# Patient Record
Sex: MALE | Race: WHITE | Employment: FULL TIME | URBAN - NONMETROPOLITAN AREA
[De-identification: names, ages, dates, MRNs, and addresses within clinical notes are randomized per-mention and may not be internally consistent; named-entity substitution may affect disease eponyms.]

---

## 2018-02-21 ENCOUNTER — OFFICE VISIT (OUTPATIENT)
Dept: PRIMARY CARE CLINIC | Age: 43
End: 2018-02-21
Payer: COMMERCIAL

## 2018-02-21 VITALS
OXYGEN SATURATION: 98 % | RESPIRATION RATE: 18 BRPM | BODY MASS INDEX: 32.6 KG/M2 | HEART RATE: 90 BPM | TEMPERATURE: 99.7 F | SYSTOLIC BLOOD PRESSURE: 120 MMHG | WEIGHT: 254 LBS | DIASTOLIC BLOOD PRESSURE: 80 MMHG | HEIGHT: 74 IN

## 2018-02-21 DIAGNOSIS — J01.40 ACUTE NON-RECURRENT PANSINUSITIS: Primary | ICD-10-CM

## 2018-02-21 PROCEDURE — 99203 OFFICE O/P NEW LOW 30 MIN: CPT | Performed by: FAMILY MEDICINE

## 2018-02-21 RX ORDER — AMOXICILLIN AND CLAVULANATE POTASSIUM 500; 125 MG/1; MG/1
1 TABLET, FILM COATED ORAL 2 TIMES DAILY
Qty: 20 TABLET | Refills: 0 | Status: SHIPPED | OUTPATIENT
Start: 2018-02-21 | End: 2018-03-03

## 2018-02-21 ASSESSMENT — ENCOUNTER SYMPTOMS
EYE DISCHARGE: 0
TROUBLE SWALLOWING: 0
SINUS PAIN: 0
SINUS PRESSURE: 1
ABDOMINAL PAIN: 0
DIARRHEA: 0
VOMITING: 0
COUGH: 1
CONSTIPATION: 0
RHINORRHEA: 1
NAUSEA: 0
EYE REDNESS: 0
WHEEZING: 0
SORE THROAT: 0
SHORTNESS OF BREATH: 0

## 2018-02-21 NOTE — PROGRESS NOTES
Subjective:      Patient ID: Susan Mei is a 36 y.o. male. Cough   This is a new problem. The current episode started in the past 7 days. The problem has been gradually worsening. The problem occurs constantly. The cough is productive of purulent sputum and productive of blood-tinged sputum. Associated symptoms include chills, ear pain, a fever, myalgias, nasal congestion, postnasal drip and rhinorrhea. Pertinent negatives include no chest pain, eye redness, headaches, rash, sore throat, shortness of breath or wheezing. Associated symptoms comments: Eyes are watering. Hoarseness to voice. Treatments tried: mucinex. The treatment provided moderate relief. There is no history of environmental allergies. Did review patient's med list, allergies, social history,pmhx and pshx today as noted in the record. Review of Systems   Constitutional: Positive for chills, fatigue and fever. HENT: Positive for congestion, ear pain, postnasal drip, rhinorrhea and sinus pressure. Negative for sinus pain, sore throat and trouble swallowing. Eyes: Negative for discharge and redness. Respiratory: Positive for cough. Negative for shortness of breath and wheezing. Cardiovascular: Negative for chest pain. Gastrointestinal: Negative for abdominal pain, constipation, diarrhea, nausea and vomiting. Musculoskeletal: Positive for myalgias. Negative for arthralgias and neck pain. Skin: Negative for rash and wound. Allergic/Immunologic: Negative for environmental allergies. Neurological: Negative for dizziness, weakness, light-headedness and headaches. Hematological: Negative for adenopathy. Psychiatric/Behavioral: Negative. Objective:   Physical Exam   Constitutional: He is oriented to person, place, and time. He appears well-developed and well-nourished. No distress. HENT:   Head: Normocephalic and atraumatic.    Right Ear: External ear normal.   Left Ear: External ear normal.   Thick sinus

## 2020-01-21 ENCOUNTER — OFFICE VISIT (OUTPATIENT)
Dept: PRIMARY CARE CLINIC | Age: 45
End: 2020-01-21
Payer: COMMERCIAL

## 2020-01-21 ENCOUNTER — HOSPITAL ENCOUNTER (OUTPATIENT)
Dept: GENERAL RADIOLOGY | Age: 45
Discharge: HOME | End: 2020-01-23
Payer: COMMERCIAL

## 2020-01-21 VITALS
WEIGHT: 271 LBS | DIASTOLIC BLOOD PRESSURE: 86 MMHG | OXYGEN SATURATION: 99 % | TEMPERATURE: 98.1 F | HEART RATE: 80 BPM | RESPIRATION RATE: 18 BRPM | SYSTOLIC BLOOD PRESSURE: 140 MMHG | HEIGHT: 74 IN | BODY MASS INDEX: 34.78 KG/M2

## 2020-01-21 PROBLEM — E66.9 OBESITY (BMI 30.0-34.9): Status: ACTIVE | Noted: 2020-01-21

## 2020-01-21 PROBLEM — E66.811 OBESITY (BMI 30.0-34.9): Status: ACTIVE | Noted: 2020-01-21

## 2020-01-21 PROCEDURE — 99213 OFFICE O/P EST LOW 20 MIN: CPT | Performed by: FAMILY MEDICINE

## 2020-01-21 PROCEDURE — 73562 X-RAY EXAM OF KNEE 3: CPT

## 2020-01-21 RX ORDER — METHYLPREDNISOLONE 4 MG/1
TABLET ORAL
Qty: 1 KIT | Refills: 0 | Status: SHIPPED | OUTPATIENT
Start: 2020-01-21 | End: 2020-01-27

## 2020-01-21 NOTE — PROGRESS NOTES
[facility] Urgent [facility]             [address], [address]                      Telephone [phone]             Fax [phone]       Jatin Mcfarlane  1975  MRN:  [id number]  Date of visit:  1/21/2020     Subjective:    Jatin Mcfarlane is a 38 y.o.  male who presents to [facility] today (1/21/2020) for evaluation of:  Knee Pain (left knee pain x 3 weeks, tender to medial side, sharp pains at times to patella, sweling, painful. no known injury)      He states that he has had pain in the left knee for the past 3 weeks. He denies injury. He has been taking Ibuprofen or Aleve for the pain, which has given him some temporary relief of his symptoms. He states that he twisted his left ankle a couple of weeks ago, but he denies any injury to the knee. He has the following problem list:  Patient Active Problem List   Diagnosis    Obesity (BMI 30.0-34. 9)      He does not take any prescription medications currently. He has No Known Allergies. He  reports that he has never smoked. He has never used smokeless tobacco.      Objective:    Vitals:    01/21/20 1602   BP: (!) 140/86   Pulse: 80   Resp: 18   Temp: 98.1 °F (36.7 °C)   SpO2: 99%   Weight: 271 lb (122.9 kg)   Height: 6' 2\" (1.88 m)     Body mass index is 34.79 kg/m². Well-nourished, well-developed obese  male, in no acute distress. The left knee has reduced range of motion. There is moderate tenderness to palpation of the left knee medially. There is no significant swelling of the left knee. There is no deformity of the left knee. Assessment and Plan:    Acute pain of left knee  Sprain vs. other    - methylPREDNISolone (MEDROL, COTY,) 4 MG tablet; Take by mouth as directed per instructions on dose pack. Take with food. Dispense: 1 kit; Refill: 0    - XR KNEE LEFT (3 VIEWS);  Future    He will be contacted when the radiologist's

## 2020-01-22 ENCOUNTER — TELEPHONE (OUTPATIENT)
Dept: FAMILY MEDICINE CLINIC | Age: 45
End: 2020-01-22

## 2020-01-22 NOTE — TELEPHONE ENCOUNTER
Patient was notified of xray results. Patient states his knee pain was a 9 this morning and was having trouble walking. He described the pain and sharp and \"felt like a knife stabbing into my knee every time I walked\" Patient took Aleve and also started Medrol lauren and stated he did has some relieve and the pain was \"tolerable\". He is requesting additional imaging to see what the problem is. He will schedule with ortho but wanted to see if there was more testing that could be done to get to the \"root\" of the problem.  Please advise

## 2020-02-05 ENCOUNTER — TELEPHONE (OUTPATIENT)
Dept: FAMILY MEDICINE CLINIC | Age: 45
End: 2020-02-05

## 2025-01-20 ENCOUNTER — OFFICE VISIT (OUTPATIENT)
Dept: PRIMARY CARE CLINIC | Age: 50
End: 2025-01-20
Payer: COMMERCIAL

## 2025-01-20 VITALS
DIASTOLIC BLOOD PRESSURE: 78 MMHG | BODY MASS INDEX: 36.85 KG/M2 | HEART RATE: 74 BPM | SYSTOLIC BLOOD PRESSURE: 130 MMHG | OXYGEN SATURATION: 98 % | TEMPERATURE: 97.3 F | WEIGHT: 287 LBS

## 2025-01-20 DIAGNOSIS — H60.332 ACUTE SWIMMER'S EAR OF LEFT SIDE: ICD-10-CM

## 2025-01-20 DIAGNOSIS — H66.90 ACUTE OTITIS MEDIA, UNSPECIFIED OTITIS MEDIA TYPE: ICD-10-CM

## 2025-01-20 DIAGNOSIS — H61.23 BILATERAL IMPACTED CERUMEN: Primary | ICD-10-CM

## 2025-01-20 PROCEDURE — 99203 OFFICE O/P NEW LOW 30 MIN: CPT

## 2025-01-20 PROCEDURE — 69210 REMOVE IMPACTED EAR WAX UNI: CPT

## 2025-01-20 RX ORDER — AMOXICILLIN 875 MG/1
875 TABLET, COATED ORAL 2 TIMES DAILY
Qty: 20 TABLET | Refills: 0 | Status: SHIPPED | OUTPATIENT
Start: 2025-01-20 | End: 2025-01-30

## 2025-01-20 RX ORDER — CIPROFLOXACIN AND DEXAMETHASONE 3; 1 MG/ML; MG/ML
4 SUSPENSION/ DROPS AURICULAR (OTIC) 2 TIMES DAILY
Qty: 7.5 ML | Refills: 0 | Status: SHIPPED | OUTPATIENT
Start: 2025-01-20 | End: 2025-01-27

## 2025-01-20 ASSESSMENT — PATIENT HEALTH QUESTIONNAIRE - PHQ9
SUM OF ALL RESPONSES TO PHQ9 QUESTIONS 1 & 2: 0
SUM OF ALL RESPONSES TO PHQ QUESTIONS 1-9: 0
SUM OF ALL RESPONSES TO PHQ QUESTIONS 1-9: 0
1. LITTLE INTEREST OR PLEASURE IN DOING THINGS: NOT AT ALL
SUM OF ALL RESPONSES TO PHQ QUESTIONS 1-9: 0
SUM OF ALL RESPONSES TO PHQ QUESTIONS 1-9: 0

## 2025-01-20 ASSESSMENT — ENCOUNTER SYMPTOMS
SORE THROAT: 0
COUGH: 0
RHINORRHEA: 0

## 2025-01-20 NOTE — PROGRESS NOTES
[facility] Walk In department of [facility]  [address] [facility] [address]  Phone: [phone]  Fax: [phone]      Fredrick Stephenson  1975  MRN: [id number]  Date of visit: 1/20/2025    Chief Complaint:     Fredrick Stephenson is here for c/o of Ear Fullness (Left ear did try to clean wax out )      HPI:     Fredrick Stephenson is a 49 y.o. male who presents to the [facility] Walk-In Care today for his medical conditions/complaints as noted below.    Ear Fullness   There is pain in the left ear. This is a new problem. Episode onset: 1 month. The problem occurs constantly. The problem has been gradually worsening. There has been no fever. The patient is experiencing no pain. Associated symptoms include hearing loss. Pertinent negatives include no coughing, ear discharge, rhinorrhea or sore throat. He has tried ear drops (wax removal kit) for the symptoms. The treatment provided no relief.       Past Medical History:   Diagnosis Date    Allergic rhinitis     Myopia         No Known Allergies      Subjective:      Review of Systems   HENT:  Positive for hearing loss. Negative for ear discharge, rhinorrhea and sore throat.    Respiratory:  Negative for cough.        Objective:     Vitals:    01/20/25 1239   BP: 130/78   Site: Left Upper Arm   Position: Sitting   Cuff Size: Large Adult   Pulse: 74   Temp: 97.3 °F (36.3 °C)   TempSrc: Tympanic   SpO2: 98%   Weight: 130.2 kg (287 lb)     Body mass index is 36.85 kg/m².    Physical Exam  Vitals and nursing note reviewed.   Constitutional:       Appearance: Normal appearance. He is not ill-appearing.   HENT:      Head: Normocephalic and atraumatic.      Right Ear: Ear canal and external ear normal. There is impacted cerumen. Tympanic membrane is erythematous.      Left Ear: Ear canal normal. Drainage, swelling and tenderness present. There is impacted cerumen. Tympanic membrane is erythematous and bulging.      Ears:      Comments:

## 2025-01-20 NOTE — PATIENT INSTRUCTIONS
Start antibiotics as prescribed  Complete whole course of antibiotics  Debrox drops to soften wax  May use tylenol and ibuprofen for pain/ fever  If symptoms worsen follow up with PCP or return to walk in clinic  Patient verbalized understanding and agrees with plan of care

## 2025-04-30 ENCOUNTER — OFFICE VISIT (OUTPATIENT)
Dept: PRIMARY CARE CLINIC | Age: 50
End: 2025-04-30
Payer: COMMERCIAL

## 2025-04-30 ENCOUNTER — RESULTS FOLLOW-UP (OUTPATIENT)
Dept: PRIMARY CARE CLINIC | Age: 50
End: 2025-04-30

## 2025-04-30 VITALS
TEMPERATURE: 100 F | HEIGHT: 74 IN | RESPIRATION RATE: 18 BRPM | WEIGHT: 280 LBS | OXYGEN SATURATION: 97 % | SYSTOLIC BLOOD PRESSURE: 124 MMHG | HEART RATE: 92 BPM | DIASTOLIC BLOOD PRESSURE: 80 MMHG | BODY MASS INDEX: 35.94 KG/M2

## 2025-04-30 DIAGNOSIS — R05.1 ACUTE COUGH: ICD-10-CM

## 2025-04-30 DIAGNOSIS — J01.40 ACUTE NON-RECURRENT PANSINUSITIS: Primary | ICD-10-CM

## 2025-04-30 LAB
INFLUENZA A ANTIGEN, POC: NEGATIVE
INFLUENZA B ANTIGEN, POC: NEGATIVE
LOT EXPIRE DATE: NORMAL
LOT KIT NUMBER: NORMAL
SARS-COV-2, POC: NORMAL
VALID INTERNAL CONTROL: NORMAL
VENDOR AND KIT NAME POC: NORMAL

## 2025-04-30 PROCEDURE — 87428 SARSCOV & INF VIR A&B AG IA: CPT

## 2025-04-30 PROCEDURE — 99213 OFFICE O/P EST LOW 20 MIN: CPT

## 2025-04-30 RX ORDER — AMOXICILLIN 875 MG/1
875 TABLET, COATED ORAL 2 TIMES DAILY
Qty: 20 TABLET | Refills: 0 | Status: SHIPPED | OUTPATIENT
Start: 2025-04-30 | End: 2025-05-10

## 2025-04-30 RX ORDER — BENZONATATE 100 MG/1
100 CAPSULE ORAL 3 TIMES DAILY PRN
Qty: 30 CAPSULE | Refills: 0 | Status: SHIPPED | OUTPATIENT
Start: 2025-04-30 | End: 2025-05-10

## 2025-04-30 SDOH — ECONOMIC STABILITY: FOOD INSECURITY: WITHIN THE PAST 12 MONTHS, YOU WORRIED THAT YOUR FOOD WOULD RUN OUT BEFORE YOU GOT MONEY TO BUY MORE.: NEVER TRUE

## 2025-04-30 SDOH — ECONOMIC STABILITY: FOOD INSECURITY: WITHIN THE PAST 12 MONTHS, THE FOOD YOU BOUGHT JUST DIDN'T LAST AND YOU DIDN'T HAVE MONEY TO GET MORE.: NEVER TRUE

## 2025-04-30 ASSESSMENT — ENCOUNTER SYMPTOMS
SINUS PRESSURE: 1
SORE THROAT: 0
SHORTNESS OF BREATH: 0
SINUS COMPLAINT: 1
COUGH: 1

## 2025-04-30 NOTE — PROGRESS NOTES
[facility] Walk In department of [facility]  [address] [facility] [address]  Phone: [phone]  Fax: [phone]      Fredrick Stephenson  1975  MRN: [id number]  Date of visit: 4/30/2025    Chief Complaint:     Fredrick Stephenson is here for c/o of Cough (Cough, head congestion and sinus pressure x 5 days)      HPI:     Fredrick Stephenson is a 50 y.o. male who presents to the [facility] Walk-In Care today for his medical conditions/complaints as noted below.    Sinus Problem  This is a new problem. Episode onset: 5 days. The problem has been gradually improving since onset. There has been no fever. Associated symptoms include chills, congestion, coughing, sinus pressure and sneezing. Pertinent negatives include no ear pain, headaches, shortness of breath or sore throat. Treatments tried: claritin D. The treatment provided moderate relief.   Sick contacts    Past Medical History:   Diagnosis Date    Allergic rhinitis     Myopia         No Known Allergies      Subjective:      Review of Systems   Constitutional:  Positive for chills.   HENT:  Positive for congestion, sinus pressure and sneezing. Negative for ear pain and sore throat.    Respiratory:  Positive for cough. Negative for shortness of breath.    Cardiovascular:  Negative for chest pain.   Neurological:  Negative for headaches.       Objective:     Vitals:    04/30/25 1043   BP: 124/80   BP Site: Left Upper Arm   Patient Position: Sitting   BP Cuff Size: Large Adult   Pulse: 92   Resp: 18   Temp: 100 °F (37.8 °C)   TempSrc: Tympanic   SpO2: 97%   Weight: 127 kg (280 lb)   Height: 1.88 m (6' 2\")     Body mass index is 35.95 kg/m².    Physical Exam  Vitals and nursing note reviewed.   Constitutional:       Appearance: Normal appearance. He is not ill-appearing.   HENT:      Head: Normocephalic and atraumatic.      Right Ear: Tympanic membrane, ear canal and external ear normal.      Left Ear: Tympanic membrane, ear canal and

## 2025-04-30 NOTE — PATIENT INSTRUCTIONS
Complete full course of antibiotics  Benzonatate as needed for cough  Continue with mucinex and nasal sprays  May use a marga pot or saline rinses as tolerated  May use tylenol for headaches  Increase water intake  If symptoms worsen follow up with PCP  Patient verbalized understanding and agrees with plan of care

## 2025-05-05 ENCOUNTER — OFFICE VISIT (OUTPATIENT)
Dept: PRIMARY CARE CLINIC | Age: 50
End: 2025-05-05
Payer: COMMERCIAL

## 2025-05-05 VITALS
DIASTOLIC BLOOD PRESSURE: 82 MMHG | SYSTOLIC BLOOD PRESSURE: 118 MMHG | BODY MASS INDEX: 36.08 KG/M2 | HEART RATE: 83 BPM | OXYGEN SATURATION: 97 % | TEMPERATURE: 96.8 F | WEIGHT: 281 LBS

## 2025-05-05 DIAGNOSIS — J01.90 ACUTE NON-RECURRENT SINUSITIS, UNSPECIFIED LOCATION: Primary | ICD-10-CM

## 2025-05-05 PROCEDURE — 99213 OFFICE O/P EST LOW 20 MIN: CPT | Performed by: PHYSICIAN ASSISTANT

## 2025-05-05 RX ORDER — FLUTICASONE PROPIONATE 50 MCG
2 SPRAY, SUSPENSION (ML) NASAL DAILY
Qty: 48 G | Refills: 0 | Status: SHIPPED | OUTPATIENT
Start: 2025-05-05

## 2025-05-05 RX ORDER — PREDNISONE 20 MG/1
20 TABLET ORAL 2 TIMES DAILY
Qty: 10 TABLET | Refills: 0 | Status: SHIPPED | OUTPATIENT
Start: 2025-05-05 | End: 2025-05-10

## 2025-05-05 ASSESSMENT — ENCOUNTER SYMPTOMS
SORE THROAT: 0
CHEST TIGHTNESS: 1
COUGH: 1
EYE REDNESS: 0
SINUS PRESSURE: 1
RHINORRHEA: 1
SINUS PAIN: 1
DIARRHEA: 0
SHORTNESS OF BREATH: 0
EYE PAIN: 0
VOMITING: 0
WHEEZING: 1
NAUSEA: 0

## 2025-05-05 NOTE — PATIENT INSTRUCTIONS
Stop Amoxicillin and start Augmentin  Complete full course of antibiotics  Continue with mucinex, allergy medication, and nasal sprays  May use saline rinses as tolerated  May use tylenol for headaches  Increase water intake  If symptoms worsen follow up with PCP or walk in  Patient verbalized understanding and agrees with plan of care

## 2025-05-05 NOTE — PROGRESS NOTES
[facility], [facility], [place], operated by [facility]PART[facility] OF [facility]  [address]  [facility] [address]  Dept: [phone]  Dept Fax: [phone]    Fredrick Stephenson is a 50 y.o. male who presents today for his medical conditions/complaints as noted below. Fredrick Stephenson is c/o of   Chief Complaint   Patient presents with    Sinusitis     Sinus pressure drainage. Seen last Wednesday and not feeling any better    .    HPI:     Patient is a 51 yo male presenting today due to sinus symptoms. He is having continued drainage and wheezing at night. He was seen in the walk in on 4/30/25 where he was treated for sinusitis and prescribed Amoxicillin. He has taken 6 days worth of Amoxicillin.     Sinusitis  This is a new problem. The current episode started 1 to 4 weeks ago. The problem is unchanged. Associated symptoms include congestion, coughing, diaphoresis, ear pain and sinus pressure. Pertinent negatives include no chills, headaches, shortness of breath or sore throat. Past treatments include antibiotics and oral decongestants. The treatment provided mild relief.         Past Medical History:   Diagnosis Date    Allergic rhinitis     Myopia      Past Surgical History:   Procedure Laterality Date    CYST INCISION AND DRAINAGE  02/2009    right low posterior neck region, Jevon Blackman    INGUINAL HERNIA REPAIR Left 1993       Family History   Problem Relation Age of Onset    Diabetes Mother     Cataracts Mother     Glaucoma Neg Hx        Social History     Tobacco Use    Smoking status: Never    Smokeless tobacco: Never   Substance Use Topics    Alcohol use: Yes     Comment: beer      Prior to Visit Medications    Medication Sig Taking? Authorizing Provider   benzonatate (TESSALON) 100 MG capsule Take 1 capsule by mouth 3 times daily as needed for Cough Yes Mynor Hernandez PA-C   amoxicillin (AMOXIL) 875 MG tablet Take 1 tablet by mouth 2 times daily

## 2025-05-28 ENCOUNTER — OFFICE VISIT (OUTPATIENT)
Dept: PRIMARY CARE CLINIC | Age: 50
End: 2025-05-28
Payer: COMMERCIAL

## 2025-05-28 VITALS
OXYGEN SATURATION: 98 % | HEART RATE: 86 BPM | BODY MASS INDEX: 34.28 KG/M2 | SYSTOLIC BLOOD PRESSURE: 138 MMHG | RESPIRATION RATE: 18 BRPM | WEIGHT: 267 LBS | DIASTOLIC BLOOD PRESSURE: 84 MMHG

## 2025-05-28 DIAGNOSIS — L23.7 ALLERGIC CONTACT DERMATITIS DUE TO PLANTS, EXCEPT FOOD: Primary | ICD-10-CM

## 2025-05-28 PROCEDURE — 96372 THER/PROPH/DIAG INJ SC/IM: CPT

## 2025-05-28 PROCEDURE — 99213 OFFICE O/P EST LOW 20 MIN: CPT

## 2025-05-28 RX ORDER — TRIAMCINOLONE ACETONIDE 40 MG/ML
40 INJECTION, SUSPENSION INTRA-ARTICULAR; INTRAMUSCULAR ONCE
Status: COMPLETED | OUTPATIENT
Start: 2025-05-28 | End: 2025-05-28

## 2025-05-28 RX ORDER — TRIAMCINOLONE ACETONIDE 1 MG/G
CREAM TOPICAL 2 TIMES DAILY
Qty: 80 G | Refills: 1 | Status: SHIPPED | OUTPATIENT
Start: 2025-05-28

## 2025-05-28 RX ADMIN — TRIAMCINOLONE ACETONIDE 40 MG: 40 INJECTION, SUSPENSION INTRA-ARTICULAR; INTRAMUSCULAR at 09:08

## 2025-05-28 ASSESSMENT — ENCOUNTER SYMPTOMS
RHINORRHEA: 0
NAUSEA: 0
COUGH: 0
SHORTNESS OF BREATH: 0
SORE THROAT: 0
DIARRHEA: 0
VOMITING: 0

## 2025-05-28 NOTE — PROGRESS NOTES
Assessment:       Diagnosis Orders   1. Allergic contact dermatitis due to plants, except food  triamcinolone acetonide (KENALOG-40) injection 40 mg             Plan:   Assessment & Plan   Return if symptoms worsen or fail to improve.     Orders Placed This Encounter   Medications    triamcinolone acetonide (KENALOG-40) injection 40 mg    triamcinolone (KENALOG) 0.1 % cream     Sig: Apply topically 2 times daily     Dispense:  80 g     Refill:  1        Rash consistent to be from allergic contact dermatitis- will give Kenalog injection in office for the acute inflammation/itchiness. Triamcinolone cream ordered for patient to apply to rash.   Claritin in AM, benadryl in PM  If symptoms worsen, SOB, difficulty swallowing seek medical treatment  Patient verbalized understanding and agrees with plan of care     Patient given educational materials - see patient instructions. Discussed use, benefits, and side effects of prescribed medications with patient. All patient questions answered and patient verbalized understanding. Instructed to continue current medications, diet and exercise. Patient agreed with the treatment plan. Encouraged patient to follow up with PCP or return to the clinic for no improvement and or worsening of symptoms.    Electronically signed by KAILYN Guy CNP on 5/28/2025 at 9:04 AM

## 2025-05-28 NOTE — PATIENT INSTRUCTIONS
Apply cream to rash twice a day  Claritin in AM, benadryl in PM  If symptoms worsen, SOB, difficulty swallowing seek medical treatment  Patient verbalized understanding and agrees with plan of care

## 2025-05-29 ENCOUNTER — OFFICE VISIT (OUTPATIENT)
Dept: PRIMARY CARE CLINIC | Age: 50
End: 2025-05-29
Payer: COMMERCIAL

## 2025-05-29 ENCOUNTER — HOSPITAL ENCOUNTER (OUTPATIENT)
Age: 50
Setting detail: SPECIMEN
Discharge: HOME | End: 2025-05-29
Payer: COMMERCIAL

## 2025-05-29 VITALS
SYSTOLIC BLOOD PRESSURE: 144 MMHG | HEIGHT: 74 IN | BODY MASS INDEX: 35.81 KG/M2 | WEIGHT: 279 LBS | RESPIRATION RATE: 18 BRPM | OXYGEN SATURATION: 97 % | TEMPERATURE: 97.6 F | DIASTOLIC BLOOD PRESSURE: 106 MMHG | HEART RATE: 100 BPM

## 2025-05-29 DIAGNOSIS — L03.114 CELLULITIS OF LEFT UPPER EXTREMITY: Primary | ICD-10-CM

## 2025-05-29 DIAGNOSIS — L03.114 CELLULITIS OF LEFT UPPER EXTREMITY: ICD-10-CM

## 2025-05-29 PROCEDURE — 87070 CULTURE OTHR SPECIMN AEROBIC: CPT

## 2025-05-29 PROCEDURE — 99213 OFFICE O/P EST LOW 20 MIN: CPT

## 2025-05-29 PROCEDURE — 87205 SMEAR GRAM STAIN: CPT

## 2025-05-29 RX ORDER — CEPHALEXIN 500 MG/1
500 CAPSULE ORAL 4 TIMES DAILY
Qty: 28 CAPSULE | Refills: 0 | Status: SHIPPED | OUTPATIENT
Start: 2025-05-29 | End: 2025-06-05

## 2025-05-29 ASSESSMENT — ENCOUNTER SYMPTOMS
SHORTNESS OF BREATH: 0
VOMITING: 0
DIARRHEA: 0
COLOR CHANGE: 1
NAUSEA: 0
CONSTIPATION: 0
ABDOMINAL PAIN: 0
COUGH: 0

## 2025-05-29 NOTE — PROGRESS NOTES
[facility] Walk In department of [facility]  [address] [facility] [address]  Phone: [phone]  Fax: [phone]      Fredrick Stephenson  1975  MRN: [id number]  Date of visit: 5/29/2025    Chief Complaint:     Fredrick Stephenson is here for c/o of Rash (Rash started x 1.5 weeks ago. He was seen yesterday but it has gotten worse. Redness and swelling. Seeping. Itching. Using RX cream, benadryl and Claritin )    HPI:     Fredrick Stephenson is a 50 y.o. male who presents to the [facility] Care today for his medical conditions/complaints as noted below.    History of Present Illness  The patient is a 50-year-old male who presents today for a rash on his left arm. He was seen yesterday and was given a Kenalog injection and started on triamcinolone cream for suspected poison ivy rash in nature. He states that it has gotten worse since yesterday with more redness and swelling. It is also seeping. The itching has improved since using the triamcinolone cream.    He reports an exacerbation of the rash on his left arm, characterized by increased redness, swelling, and yellow drainage. The onset of the rash was approximately 1.5 weeks ago, initially presenting as two spots that he mistook for cuts. The condition has since evolved into a series of bumps, with new lesions appearing on the back of his left arm. He also notes a sensation of stiffness in his arm. He is a  and frequently sustains open wounds due to his occupation. He recalls an incident where he was handling dirt and mud, which he suspects may have introduced bacteria into an open wound. He has been applying the triamcinolone cream once daily, which has resulted in some improvement in itching. He has recently completed a course of Augmentin at the beginning of the month for sinusitis.    Past Medical History:   Diagnosis Date    Allergic rhinitis     Myopia         No Known Allergies      Subjective:      Review of

## 2025-05-29 NOTE — PATIENT INSTRUCTIONS
Keep area clean and dry  May use bacitracin to area  Marked area with pen, if redness increases, fever, or shortness of breath seek medical treatment  Begin and complete full course of antibiotics  Push fluids

## 2025-05-30 ENCOUNTER — RESULTS FOLLOW-UP (OUTPATIENT)
Dept: PRIMARY CARE CLINIC | Age: 50
End: 2025-05-30

## 2025-05-31 ENCOUNTER — HOSPITAL ENCOUNTER (OUTPATIENT)
Age: 50
Discharge: HOME | End: 2025-05-31
Payer: COMMERCIAL

## 2025-05-31 ENCOUNTER — RESULTS FOLLOW-UP (OUTPATIENT)
Dept: FAMILY MEDICINE CLINIC | Age: 50
End: 2025-05-31

## 2025-05-31 ENCOUNTER — OFFICE VISIT (OUTPATIENT)
Dept: PRIMARY CARE CLINIC | Age: 50
End: 2025-05-31

## 2025-05-31 VITALS
HEIGHT: 74 IN | OXYGEN SATURATION: 96 % | HEART RATE: 91 BPM | DIASTOLIC BLOOD PRESSURE: 92 MMHG | TEMPERATURE: 98 F | BODY MASS INDEX: 35.94 KG/M2 | WEIGHT: 280 LBS | SYSTOLIC BLOOD PRESSURE: 130 MMHG

## 2025-05-31 DIAGNOSIS — Z83.3 FAMILY HISTORY OF DIABETES MELLITUS IN MOTHER: ICD-10-CM

## 2025-05-31 DIAGNOSIS — Z86.39 HISTORY OF ELEVATED GLUCOSE: ICD-10-CM

## 2025-05-31 DIAGNOSIS — L03.114 CELLULITIS OF LEFT UPPER ARM: ICD-10-CM

## 2025-05-31 DIAGNOSIS — L03.114 CELLULITIS OF LEFT UPPER ARM: Primary | ICD-10-CM

## 2025-05-31 LAB
ANION GAP SERPL CALCULATED.3IONS-SCNC: 12 MMOL/L (ref 9–16)
BASOPHILS # BLD: 0.03 K/UL (ref 0–0.2)
BASOPHILS NFR BLD: 1 % (ref 0–2)
BUN SERPL-MCNC: 18 MG/DL (ref 6–20)
BUN/CREAT SERPL: 20 (ref 9–20)
CALCIUM SERPL-MCNC: 9.5 MG/DL (ref 8.6–10.4)
CHLORIDE SERPL-SCNC: 104 MMOL/L (ref 98–107)
CHP ED QC CHECK: NORMAL
CO2 SERPL-SCNC: 24 MMOL/L (ref 20–31)
CREAT SERPL-MCNC: 0.9 MG/DL (ref 0.7–1.2)
EOSINOPHIL # BLD: 0.26 K/UL (ref 0–0.44)
EOSINOPHILS RELATIVE PERCENT: 5 % (ref 1–4)
ERYTHROCYTE [DISTWIDTH] IN BLOOD BY AUTOMATED COUNT: 14 % (ref 11.8–14.4)
EST. AVERAGE GLUCOSE BLD GHB EST-MCNC: 105 MG/DL
GFR, ESTIMATED: >90 ML/MIN/1.73M2
GLUCOSE BLD-MCNC: 157 MG/DL
GLUCOSE SERPL-MCNC: 157 MG/DL (ref 74–99)
HBA1C MFR BLD: 5.3 % (ref 4–6)
HCT VFR BLD AUTO: 48 % (ref 40.7–50.3)
HGB BLD-MCNC: 17.3 G/DL (ref 13–17)
IMM GRANULOCYTES # BLD AUTO: <0.03 K/UL (ref 0–0.3)
IMM GRANULOCYTES NFR BLD: 0 %
LYMPHOCYTES NFR BLD: 1.88 K/UL (ref 1.1–3.7)
LYMPHOCYTES RELATIVE PERCENT: 34 % (ref 24–43)
MCH RBC QN AUTO: 32.5 PG (ref 25.2–33.5)
MCHC RBC AUTO-ENTMCNC: 36 G/DL (ref 25.2–33.5)
MCV RBC AUTO: 90.2 FL (ref 82.6–102.9)
MICROORGANISM SPEC CULT: NORMAL
MICROORGANISM/AGENT SPEC: NORMAL
MICROORGANISM/AGENT SPEC: NORMAL
MONOCYTES NFR BLD: 0.4 K/UL (ref 0.1–1.2)
MONOCYTES NFR BLD: 7 % (ref 3–12)
NEUTROPHILS NFR BLD: 53 % (ref 36–65)
NEUTS SEG NFR BLD: 2.95 K/UL (ref 1.5–8.1)
NRBC BLD-RTO: 0 PER 100 WBC
PLATELET # BLD AUTO: ABNORMAL K/UL (ref 138–453)
PLATELET, FLUORESCENCE: 146 K/UL (ref 138–453)
PLATELETS.RETICULATED NFR BLD AUTO: 1.8 % (ref 1.1–10.3)
POTASSIUM SERPL-SCNC: 4.6 MMOL/L (ref 3.7–5.3)
RBC # BLD AUTO: 5.32 M/UL (ref 4.21–5.77)
SODIUM SERPL-SCNC: 140 MMOL/L (ref 136–145)
SPECIMEN DESCRIPTION: NORMAL
WBC OTHER # BLD: 5.5 K/UL (ref 3.5–11.3)

## 2025-05-31 PROCEDURE — 80048 BASIC METABOLIC PNL TOTAL CA: CPT

## 2025-05-31 PROCEDURE — 83036 HEMOGLOBIN GLYCOSYLATED A1C: CPT

## 2025-05-31 PROCEDURE — 36415 COLL VENOUS BLD VENIPUNCTURE: CPT

## 2025-05-31 PROCEDURE — 85025 COMPLETE CBC W/AUTO DIFF WBC: CPT

## 2025-05-31 RX ORDER — CEFTRIAXONE 1 G/1
1000 INJECTION, POWDER, FOR SOLUTION INTRAMUSCULAR; INTRAVENOUS ONCE
Status: COMPLETED | OUTPATIENT
Start: 2025-05-31 | End: 2025-05-31

## 2025-05-31 RX ORDER — DOXYCYCLINE HYCLATE 100 MG
100 TABLET ORAL 2 TIMES DAILY
Qty: 20 TABLET | Refills: 0 | Status: SHIPPED | OUTPATIENT
Start: 2025-05-31 | End: 2025-06-10

## 2025-05-31 RX ORDER — LIDOCAINE HYDROCHLORIDE 10 MG/ML
2.1 INJECTION, SOLUTION EPIDURAL; INFILTRATION; INTRACAUDAL; PERINEURAL ONCE
Status: COMPLETED | OUTPATIENT
Start: 2025-05-31 | End: 2025-05-31

## 2025-05-31 RX ADMIN — CEFTRIAXONE 1000 MG: 1 INJECTION, POWDER, FOR SOLUTION INTRAMUSCULAR; INTRAVENOUS at 10:57

## 2025-05-31 RX ADMIN — LIDOCAINE HYDROCHLORIDE 2.1 ML: 10 INJECTION, SOLUTION EPIDURAL; INFILTRATION; INTRACAUDAL; PERINEURAL at 12:05

## 2025-05-31 NOTE — PROGRESS NOTES
[place] [address]                        Telephone [phone]             Fax [phone]       Fredrick Stephenson  :  1975  Age:  50 y.o.   MRN:  [id number]  Date of visit:  2025       Assessment and Plan:    1. Cellulitis of left upper arm  I reviewed the photo and the wound culture from the visit 2025.  Ceftriaxone was ordered and administered:  - cefTRIAXone (ROCEPHIN) injection 1,000 mg  - lidocaine PF 1 % injection 2.1 mL    He was advise to continue Keflex, and add Doxycycline.  Doxycycline was prescribed.  - doxycycline hyclate (VIBRA-TABS) 100 MG tablet; Take 1 tablet by mouth 2 times daily for 10 days  Dispense: 20 tablet; Refill: 0    - CBC with Auto Differential; Future was ordered to be done today.  He will be contacted when the results are available.     2. History of elevated glucose  3. Family history of diabetes mellitus in mother  POCT glucose was 157 mg/dL.    I discussed with the patient that the diagnosis of diabetes cannot be made with one glucose value.    Additional labs were ordered to be done today:  - Basic Metabolic Panel; Future  - Hemoglobin A1C; Future    He will be contacted when the results are available.    I also discussed with the patient that patients with diabetes may be more susceptible to certain infections.      He was advised to follow up if symptoms worsen or do not resolve.        Subjective:    Fredrick Stephenson is a 50 y.o. male who presents to [facility] today (2025) for evaluation of:  Rash (Pt L arm is not getting better and this is the 3 rd time he has been here. )      History of Present Illness  The patient presents for evaluation of a worsening skin infection.    He has sought medical attention twice for a skin condition that initially presented with severe itching and numerous bumps on the left upper arm. The condition has